# Patient Record
Sex: MALE
[De-identification: names, ages, dates, MRNs, and addresses within clinical notes are randomized per-mention and may not be internally consistent; named-entity substitution may affect disease eponyms.]

---

## 2017-10-27 ENCOUNTER — HOSPITAL ENCOUNTER (EMERGENCY)
Dept: HOSPITAL 42 - ED | Age: 32
Discharge: HOME | End: 2017-10-27
Payer: SELF-PAY

## 2017-10-27 VITALS
HEART RATE: 88 BPM | OXYGEN SATURATION: 99 % | RESPIRATION RATE: 18 BRPM | DIASTOLIC BLOOD PRESSURE: 79 MMHG | SYSTOLIC BLOOD PRESSURE: 155 MMHG

## 2017-10-27 VITALS — BODY MASS INDEX: 28.7 KG/M2

## 2017-10-27 VITALS — TEMPERATURE: 99.3 F

## 2017-10-27 DIAGNOSIS — Z88.0: ICD-10-CM

## 2017-10-27 DIAGNOSIS — K02.9: Primary | ICD-10-CM

## 2017-10-27 PROCEDURE — 99282 EMERGENCY DEPT VISIT SF MDM: CPT

## 2017-10-27 PROCEDURE — 96372 THER/PROPH/DIAG INJ SC/IM: CPT

## 2017-10-27 NOTE — ED PDOC
Arrival/HPI





- General


Historian: Patient





- History of Present Illness


Time/Duration: Other (see hpi)


Context: Home





<Kenrick Flores - Last Filed: 10/27/17 20:22>





<Heber Moy - Last Filed: 10/28/17 03:49>





- General


Chief Complaint: Dental Pain


Time Seen by Provider: 10/27/17 20:15





- History of Present Illness


Narrative History of Present Illness (Text): 





10/27/17 20:22


This 33 yo male presents to this ED c/o right upper and lower teethache since 

yesterday.  Patient stated he has a pmh dental caries, and he is requesting ABX 

and pain medication.  Patient denies facial swelling, sore throat, recent 

surgery, dysphagia, recent travel, dental trauma or fever. (Kenrick Flores)





Past Medical History





- Provider Review


Nursing Documentation Reviewed: Yes





- Infectious Disease


Hx of Infectious Diseases: None





- Pulmonary


Hx Asthma: Yes





- Psychiatric


Hx Substance Use: Yes (marijuana)





- Anesthesia


Hx Anesthesia: No





<Kenrick Flores - Last Filed: 10/27/17 20:22>





Family/Social History





- Physician Review


Nursing Documentation Reviewed: Yes


Family/Social History: Other (noncontributory)


Smoking Status: Smoker Currrent Status Unknown


Hx Alcohol Use: No


Hx Substance Use: Yes (marijuana)





<Kenrick Flores - Last Filed: 10/27/17 20:22>





Allergies/Home Meds





<Kenrick Flores - Last Filed: 10/27/17 20:22>





<Heber Moy - Last Filed: 10/28/17 03:49>


Allergies/Adverse Reactions: 


Allergies





Penicillins Allergy (Verified 07/09/16 07:01)


 SWELLING











Review of Systems





- Review of Systems


Constitutional: Normal.  absent: Fatigue, Weight Change, Fevers


Eyes: Normal


ENT: Other (dental pain)


Respiratory: Normal.  absent: SOB, Cough


Cardiovascular: Normal.  absent: Chest Pain, Palpitations


Gastrointestinal: Normal.  absent: Abdominal Pain, Nausea, Vomiting


Genitourinary Male: Normal.  absent: Dysuria, Frequency, Hematuria


Musculoskeletal: Normal


Skin: Normal.  absent: Rash, Pruritis, Skin Lesions


Neurological: Normal.  absent: Headache, Dizziness, Focal Weakness, Gait Changes

, Speech Changes, Facial Droop, Disequilibrium


Endocrine: Normal


Hemo/Lymphatic: Normal


Psychiatric: Normal





<Kenrick Flores - Last Filed: 10/27/17 20:22>





Physical Exam


Temperature: Afebrile


Blood Pressure: Normal


Pulse: Regular


Respiratory Rate: Normal


Appearance: Positive for: Well-Appearing, Non-Toxic, Comfortable


Pain Distress: None


Mental Status: Positive for: Alert and Oriented X 3





- Systems Exam


Head: Present: Atraumatic, Normocephalic


Pupils: Present: PERRL


Extroacular Muscles: Present: EOMI


Conjunctiva: Present: Normal


Ears: Present: Normal.  No: NORMAL TM, Erythema, Normal Canal


Mouth: Present: Moist Mucous Membranes, Normal Lips, Normal Tounge, Other (

Generalized denatl caries.  (+) right posterior lower gums are mild swollen, 

but no dental abscess noted).  No: Drooling


Neck: Present: Normal Range of Motion


Respiratory/Chest: Present: Clear to Auscultation, Good Air Exchange.  No: 

Respiratory Distress, Accessory Muscle Use, Wheezes


Cardiovascular: Present: Regular Rate and Rhythm, Normal S1, S2.  No: Murmurs


Upper Extremity: Present: Normal Inspection, Normal ROM


Lower Extremity: Present: Normal Inspection, Normal ROM


Neurological: Present: GCS=15, CN II-XII Intact, Speech Normal, Motor Func 

Grossly Intact, Normal Sensory Function, Normal Cerebellar Funct, Gait Normal, 

Memory Normal


Skin: Present: Warm, Dry, Normal Color.  No: Rashes


Psychiatric: Present: Alert, Oriented x 3, Normal Insight, Normal Concentration





<MarkGinanolberto CUENCA - Last Filed: 10/27/17 20:22>





Vital Signs











  Temp Pulse Resp BP Pulse Ox


 


 10/27/17 21:00   88  18  155/79 H  99


 


 10/27/17 20:02  99.3 F  76  16  163/89 H  98














Medical Decision Making


Re-evaluation Time: 20:28


Reassessment Condition: Re-examined, Improved





<Kenrick Flores - Last Filed: 10/27/17 20:22>





<Heber Moy - Last Filed: 10/28/17 03:49>


ED Course and Treatment: 





10/27/17 20:28


Patient is resting comfortably, and is in no acute distress.  Patient was 

instructed to follow up with PMD in 1-2 days for further evaluation.


Patient was recommended to [facility] dental clinic in 1-2 days.


 (Kenrick Flores)





- Medication Orders


Current Medication Orders: 














Discontinued Medications





Clindamycin HCl (Cleocin)  300 mg PO STAT STA


   PRN Reason: Protocol


   Stop: 10/27/17 20:27


   Last Admin: 10/27/17 20:56  Dose: 300 mg





Ketorolac Tromethamine (Toradol)  30 mg IM STAT STA


   Stop: 10/27/17 20:28


   Last Admin: 10/27/17 20:56  Dose: 30 mg





MAR Pain Assessment


 Document     10/27/17 20:56  EQ  (Rec: 10/27/17 20:56  EQ  [id number]


     Pain Reassessment


      Is this a pain reassessment?               No


     Sleep


      Is patient sleeping during reassessment?   No


     Presence of Pain


      Presence of Pain                           Yes


IM Administration Charges


 Document     10/27/17 20:56  EQ  (Rec: 10/27/17 20:56  EQ  [facility] [id number]


     Charges for Administration


      # of IM Administrations                    1














- PA / NP / Resident Statement


MD/ has reviewed & agrees with the documentation as recorded.





<Heber Moy - Last Filed: 10/28/17 03:49>





Disposition/Present on Arrival





- Present on Arrival


Any Indicators Present on Arrival: No


History of DVT/PE: No


History of Uncontrolled Diabetes: No


Urinary Catheter: No


History of Decub. Ulcer: No


History Surgical Site Infection Following: None





- Disposition


Have Diagnosis and Disposition been Completed?: Yes


Disposition Time: 20:29


Patient Plan: Discharge





<Kenrick Flores - Last Filed: 10/27/17 20:22>





<Heber Moy - Last Filed: 10/28/17 03:49>





- Disposition


Diagnosis: 


 Pain due to dental caries





Disposition: HOME/ ROUTINE


Condition: GOOD


Discharge Instructions (ExitCare):  Dental Caries (ED)


Additional Instructions: 


Call private doctor for follow up visit in 1-2 days.  Take medication as 

instructed.  call dentist for further medical management.





The [facility] is open from 8 a.m. to 4 p.m. 

Monday through Friday.  For more information or to schedule an appointment, 

please call [phone].


Prescriptions: 


Acetaminophen/Hydrocodone Bi [Vicodin 300 mg-5 mg] 1 tab PO DAILY #3 tab


Chlorhexidine 0.12% [Peridex] 15 ml PO BID #1 bottle


Clindamycin [Cleocin] 300 mg PO TID #30 cap


Naproxen 500 mg PO BID PRN #14 tab


 PRN Reason: Pain, Severe (8-10)


Referrals: 


PCP,NO [Primary Care Provider] - Follow up with primary


 Service [Outside] - Follow up with primary


[facility] [Outside] - Follow up with primary


Forms:  [employer] (English), WORK NOTE

## 2018-03-31 ENCOUNTER — HOSPITAL ENCOUNTER (EMERGENCY)
Dept: HOSPITAL 42 - ED | Age: 33
Discharge: HOME | End: 2018-03-31
Payer: SELF-PAY

## 2018-03-31 VITALS
DIASTOLIC BLOOD PRESSURE: 84 MMHG | OXYGEN SATURATION: 97 % | RESPIRATION RATE: 18 BRPM | TEMPERATURE: 98.9 F | HEART RATE: 72 BPM | SYSTOLIC BLOOD PRESSURE: 143 MMHG

## 2018-03-31 VITALS — BODY MASS INDEX: 28.7 KG/M2

## 2018-03-31 DIAGNOSIS — K04.7: Primary | ICD-10-CM

## 2018-03-31 PROCEDURE — 96372 THER/PROPH/DIAG INJ SC/IM: CPT

## 2018-03-31 PROCEDURE — 99282 EMERGENCY DEPT VISIT SF MDM: CPT

## 2018-03-31 NOTE — ED PDOC
Arrival/HPI





- General


Time Seen by Provider: 03/31/18 00:17


Historian: Patient





- History of Present Illness


Narrative History of Present Illness (Text): 





03/31/18 00:17


31yo male with no PMhx who present with complaint of right lower toothache and 

swelling. states pain started 2days ago and swelling started today. Notes 

history of gingivitis. States he sees a Dentist, but not often because of 

finance. States he was taking ASA with relieve. Denies fever, trauma, any other 

complaint.





Past Medical History





- Provider Review


Nursing Documentation Reviewed: Yes





- Infectious Disease


Hx of Infectious Diseases: None





- Pulmonary


Hx Asthma: Yes





- Psychiatric


Hx Substance Use: Yes (marijuana)





- Anesthesia


Hx Anesthesia: No





Family/Social History





- Physician Review


Nursing Documentation Reviewed: Yes


Family/Social History: Unknown Family HX


Smoking Status: Smoker Currrent Status Unknown


Hx Alcohol Use: No


Hx Substance Use: Yes (marijuana)





Allergies/Home Meds


Allergies/Adverse Reactions: 


Allergies





Penicillins Allergy (Verified 07/09/16 07:01)


 SWELLING











Review of Systems





- Physician Review


All systems were reviewed & negative as marked: Yes





- Review of Systems


Constitutional: Normal


Eyes: Normal


ENT: Other (toothache)


Respiratory: Normal


Cardiovascular: Normal


Gastrointestinal: Normal


Genitourinary Male: Normal


Musculoskeletal: Normal


Skin: Normal


Neurological: Normal


Endocrine: Normal


Hemo/Lymphatic: Normal


Psychiatric: Normal





Physical Exam


Vital Signs Reviewed: Yes


Temperature: Afebrile


Blood Pressure: Normal


Pulse: Regular


Respiratory Rate: Normal


Appearance: Positive for: Well-Appearing, Non-Toxic, Comfortable


Pain Distress: None


Mental Status: Positive for: Alert and Oriented X 3





- Systems Exam


Head: Present: Atraumatic, Normocephalic


Pupils: Present: PERRL


Extroacular Muscles: Present: EOMI


Conjunctiva: Present: Normal


Mouth: Present: Moist Mucous Membranes, Other (mild over laying swelling over 

the right jaw).  No: Normal Teeth (Poor dentition in general. Multiple missing 

tooth. Right sided lower lower noted with moderate swellign and cracked 

premolar and molar)


Neck: Present: Normal Range of Motion


Respiratory/Chest: Present: Clear to Auscultation, Good Air Exchange.  No: 

Respiratory Distress, Accessory Muscle Use


Cardiovascular: Present: Regular Rate and Rhythm, Normal S1, S2.  No: Murmurs


Abdomen: Present: Normal Bowel Sounds.  No: Tenderness, Distention, Peritoneal 

Signs


Back: Present: Normal Inspection


Upper Extremity: Present: Normal Inspection.  No: Cyanosis, Edema


Lower Extremity: Present: Normal Inspection.  No: Edema


Neurological: Present: GCS=15, CN II-XII Intact, Speech Normal


Skin: Present: Warm, Dry, Normal Color.  No: Rashes


Psychiatric: Present: Alert, Oriented x 3, Normal Insight, Normal Concentration





Disposition/Present on Arrival





- Present on Arrival


Any Indicators Present on Arrival: No


History of DVT/PE: No


History of Uncontrolled Diabetes: No


Urinary Catheter: No


History Surgical Site Infection Following: None





- Disposition


Have Diagnosis and Disposition been Completed?: Yes


Diagnosis: 


 Dental abscess





Disposition: HOME/ ROUTINE


Disposition Time: 00:25


Patient Plan: Discharge


Condition: STABLE


Discharge Instructions (ExitCare):  Tooth Abscess (DC)


Additional Instructions: 


Follow up with your Dentist


Return to ED for any new symptoms


Prescriptions: 


Clindamycin [Cleocin] 300 mg PO TID #21 cap


Ibuprofen [Motrin Tab] 600 mg PO Q6 #20 tab


traMADol [Ultram] 50 mg PO TID #9 tab


Referrals: 


[facility] [place] [Outside] - Follow up with primary